# Patient Record
Sex: MALE | HISPANIC OR LATINO | Employment: OTHER | URBAN - METROPOLITAN AREA
[De-identification: names, ages, dates, MRNs, and addresses within clinical notes are randomized per-mention and may not be internally consistent; named-entity substitution may affect disease eponyms.]

---

## 2017-07-11 ENCOUNTER — OFFICE VISIT (OUTPATIENT)
Dept: FAMILY MEDICINE | Facility: CLINIC | Age: 42
End: 2017-07-11

## 2017-07-11 VITALS
WEIGHT: 201.2 LBS | BODY MASS INDEX: 30.49 KG/M2 | OXYGEN SATURATION: 98 % | HEIGHT: 68 IN | HEART RATE: 65 BPM | DIASTOLIC BLOOD PRESSURE: 81 MMHG | TEMPERATURE: 97.8 F | SYSTOLIC BLOOD PRESSURE: 119 MMHG

## 2017-07-11 DIAGNOSIS — J01.90 ACUTE SINUSITIS WITH SYMPTOMS > 10 DAYS: Primary | ICD-10-CM

## 2017-07-11 PROCEDURE — 99213 OFFICE O/P EST LOW 20 MIN: CPT | Performed by: PHYSICIAN ASSISTANT

## 2017-07-11 RX ORDER — BENZONATATE 100 MG/1
CAPSULE ORAL
Refills: 0 | COMMUNITY
Start: 2017-06-28 | End: 2017-12-14

## 2017-07-11 RX ORDER — FLUTICASONE PROPIONATE 50 MCG
2 SPRAY, SUSPENSION (ML) NASAL DAILY
Qty: 1 BOTTLE | Refills: 3 | Status: SHIPPED | OUTPATIENT
Start: 2017-07-11 | End: 2017-12-14

## 2017-07-11 NOTE — MR AVS SNAPSHOT
After Visit Summary   7/11/2017    Chun Coley    MRN: [id number]           Patient Information     Date Of Birth          1975        Visit Information        Provider Department      7/11/2017 9:00 AM Vahe Leon PA [facility]        Today's Diagnoses     Acute sinusitis with symptoms > 10 days    -  1      Care Instructions    Based on your medical history and these are the current health maintenance or preventive care services that you are due for (some may have been done at this visit)  Health Maintenance Due   Topic Date Due     TETANUS IMMUNIZATION (SYSTEM ASSIGNED)  06/12/1991     LIPID SCREEN Q5 YR MALE (SYSTEM ASSIGNED)  06/12/2008     ASTHMA CONTROL TEST Q6 MOS  08/03/2014     ASTHMA ACTION PLAN Q1 YR  02/06/2015       At [facility], we strive to deliver an exceptional experience to you, every time we see you.    If you receive a survey in the mail, please send us back your thoughts. We really do value your feedback.    Your care team's suggested websites for health information:  Www.[employer].org : Up to date and easily searchable information on multiple topics.  Www.medlineplus.gov : medication info, interactive tutorials, watch real surgeries online  Www.familydoctor.org : good info from the Academy of Family Physicians  Www.cdc.gov : public health info, travel advisories, epidemics (H1N1)  Www.aap.org : children's health info, normal development, vaccinations  Www.health.state[place]us : [place] dept of health, public health issues in [place], N1N1    How to contact your care team:   Urgent Care/Same Day [phone]         Pharmacy [phone]      Clinic hours  M-Th 7 am-7 pm   Fri 7 am-5 pm.   Urgent care M-F 11 am-9 pm,   Sat/Sun 9 am-5 pm.  Pharmacy M-Th 8 am-8 pm Fri 8 am-6 pm  Sat/Sun 9 am-5 pm.     All password changes, disabled accounts, or ID changes in [employer]/MyHealth will be done by our Access Services  Department.    If you need help with your account or password, call: [phone]. Clinic staff no longer has the ability to change passwords.     TRIAL over the counter PSEUDOEPHEDRINE (during the day) AND AFRIN (Oxymetazolin) NASAL SPRAY  (for 3 days maximum).      Sinusitis [Abx Tx]    The sinuses are air-filled spaces within the bones of the face. They connect to the inside of the nose. Sinusitis is an inflammation of the tissue lining the sinus cavity. Sinus inflammation can occur during a cold or hay-fever (allergies to pollens and other particles in the air) and cause symptoms of sinus congestion and fullness. A sinus infection causes fever, headache and facial pain. There is usually green or yellow drainage from the nose or into the back of the throat (post-nasal drip). Antibiotics are prescribed to treat this condition.  Home Care:  Drink plenty of water, hot tea, and other liquids to stay well hydrated. This thins the mucus and promotes sinus drainage.  Apply heat to the painful areas of the face. Use a towel soaked in hot water. Or,  the shower and direct the hot spray onto your face. This is a good way to inhale warm water vapor and get heat on your face at the same time. (Cover your mouth and nose with your hands so you can still breathe as you do this.)  Use a vaporizer with products such as Vicks VapoRub (contains menthol) at night. Suck on peppermint, menthol or eucalyptus hard candies during the day.  An expectorant containing guaifenesin (such as Robitussin), helps to thin the mucus and promote drainage from the sinuses.  Over-the-counter decongestants may be used unless a similar medicine was prescribed. Nasal sprays work the fastest. Use one that contains phenylephrine (Sidney-synephrine, Sinex and others) or oxymetazoline (Afrin). First blow the nose gently to remove mucus, then apply the drops. Do not use these medicines more often than directed on the label or for more than three days  or symptoms may worsen. You may also use tablets containing pseudoephedrine (Sudafed). Many sinus remedies combine ingredients, which may increase side effects. Read the labels or ask the pharmacist for help. NOTE: Persons with high blood pressure should not use decongestants. They can raise blood pressure.  Antihistamines are useful if allergies are a cause of your sinusitis. The mildest one is chlorpheniramine (available without a prescription). The dose for adults is 8-12mg three times a day. [NOTE: Do not use chlorpheniramine if you have glaucoma or if you are a man with trouble urinating due to an enlarged prostate.] Claritin (loratidine) is an antihistamine that causes less drowsiness and is a good alternative for daytime use.  Do not use nasal rinses or irrigation during an acute sinus infection, unless advised by your doctor. Rinsing may spread the infection to other sinuses.  You may use acetaminophen (Tylenol) or ibuprofen (Motrin, Advil) to control pain, unless another pain medicine was prescribed. [ NOTE: If you have chronic liver or kidney disease or ever had a stomach ulcer, talk with your doctor before using these medicines.] (Aspirin should never be used in anyone under 18 years of age who is ill with a fever. It may cause severe liver damage.)  Finish the full course, even if you are feeling better after a few days.  Follow Up  with your doctor or this facility in one week or as instructed by our staff if not improving.  Get Prompt Medical Attention  if any of the following occur:  Facial pain or headache becomes more severe  Stiff neck  Unusual drowsiness or confusion, or not acting like your normal self  Swelling of the forehead or eyelids  Vision problems including blurred or double vision  Fever of 100.4 F (38 C) or higher, or as directed by your healthcare provider  Seizure    2955-5294 Derik Chavez, [address] [place] [address], [place] [address]. All rights reserved. This information is not  "intended as a substitute for professional medical care. Always follow your healthcare professional's instructions.                Follow-ups after your visit        Follow-up notes from your care team     Return if symptoms worsen or fail to improve.      Who to contact     If you have questions or need follow up information about today's clinic visit or your schedule please contact [facility] directly at [phone].  Normal or non-critical lab and imaging results will be communicated to you by MyChart, letter or phone within 4 business days after the clinic has received the results. If you do not hear from us within 7 days, please contact the clinic through [employer] or phone. If you have a critical or abnormal lab result, we will notify you by phone as soon as possible.  Submit refill requests through "[employer] or call your pharmacy and they will forward the refill request to us. Please allow 3 business days for your refill to be completed.          Additional Information About Your Visit        MyChart Information     "[employer] lets you send messages to your doctor, view your test results, renew your prescriptions, schedule appointments and more. To sign up, go to [email or web address] . Click on \"Log in\" on the left side of the screen, which will take you to the Welcome page. Then click on \"Sign up Now\" on the right side of the page.     You will be asked to enter the access code listed below, as well as some personal information. Please follow the directions to create your username and password.     Your access code is: [email or web address]  Expires: 10/9/2017  9:15 AM     Your access code will  in 90 days. If you need help or a new code, please call your [facility] or [phone].        Care EveryWhere ID     This is your Care EveryWhere ID. This could be used by other organizations to access your [facility] medical records  [id number]        Your Vitals Were     Pulse Temperature Height " "Pulse Oximetry BMI (Body Mass Index)       65 97.8  F (36.6  C) (Oral) 5' 8\" (1.727 m) 98% 30.59 kg/m2        Blood Pressure from Last 3 Encounters:   07/11/17 119/81   04/21/15 108/72   02/03/14 108/68    Weight from Last 3 Encounters:   07/11/17 201 lb 3.2 oz (91.3 kg)   04/21/15 193 lb 9.6 oz (87.8 kg)   02/03/14 189 lb 9.6 oz (86 kg)              Today, you had the following     No orders found for display         Today's Medication Changes          These changes are accurate as of: 7/11/17  9:15 AM.  If you have any questions, ask your nurse or doctor.               Start taking these medicines.        Dose/Directions    amoxicillin-clavulanate 875-125 MG per tablet   Commonly known as:  AUGMENTIN   Used for:  Acute sinusitis with symptoms > 10 days   Started by:  Vahe Leon PA        Dose:  1 tablet   Take 1 tablet by mouth 2 times daily   Quantity:  20 tablet   Refills:  0         These medicines have changed or have updated prescriptions.        Dose/Directions    * fluticasone 50 MCG/ACT spray   Commonly known as:  FLONASE   This may have changed:  Another medication with the same name was added. Make sure you understand how and when to take each.   Used for:  Mild persistent asthma   Changed by:  Lily Morgan APRN CNP        Dose:  1 spray   Spray 1 spray into both nostrils daily   Quantity:  3 Package   Refills:  3       * fluticasone 50 MCG/ACT spray   Commonly known as:  FLONASE   This may have changed:  You were already taking a medication with the same name, and this prescription was added. Make sure you understand how and when to take each.   Used for:  Acute sinusitis with symptoms > 10 days   Changed by:  Vahe Leon PA        Dose:  2 spray   Spray 2 sprays into both nostrils daily   Quantity:  1 Bottle   Refills:  3       * Notice:  This list has 2 medication(s) that are the same as other medications prescribed for you. Read the directions carefully, and ask " your doctor or other care provider to review them with you.         Where to get your medicines      These [place] [facility] [place] [address]  [address], [facility] [address]     Phone:  [phone]     amoxicillin-clavulanate 875-125 MG per tablet    fluticasone 50 MCG/ACT spray                Primary Care Provider Office Phone # Fax #    Jorge Alberto Crawford [phone]       [facility] [address]  [facility] [address]        Equal Access to Services     [facility]: Hadii aad ku hadasho Soomaali, waaxda luqadaha, qaybta kaalmada adeegyada, waxay idiin hayaan adeeg khararobert krishnamurthy . So [facility] [phone].    ATENCIÓN: Si habla español, tiene a banegas disposición servicios gratuitos de asistencia lingüística. Ll[facility] [phone].    We comply with applicable federal civil rights laws and [place] laws. We do not discriminate on the basis of race, color, national origin, age, disability sex, sexual orientation or gender identity.            Thank you!     Thank you for choosing [facility]  for your care. Our goal is always to provide you with excellent care. Hearing back from our patients is one way we can continue to improve our services. Please take a few minutes to complete the written survey that you may receive in the mail after your visit with us. Thank you!             Your Updated Medication List - Protect others around you: Learn how to safely use, store and throw away your medicines at www.disposemymeds.org.          This list is accurate as of: 7/11/17  9:15 AM.  Always use your most recent med list.                   Brand Name Dispense Instructions for use Diagnosis    * albuterol 108 (90 BASE) MCG/ACT Inhaler    PROAIR HFA/PROVENTIL HFA/VENTOLIN HFA    3 Inhaler    Inhale 2 puffs into the lungs every 6 hours as needed    Mild persistent asthma       * albuterol 108 (90  BASE) MCG/ACT Inhaler    PROAIR HFA/PROVENTIL HFA/VENTOLIN HFA    1 Inhaler    Inhale 2 puffs into the lungs every 4 hours as needed for shortness of breath / dyspnea or wheezing    Acute bronchitis with symptoms > 10 days       amoxicillin-clavulanate 875-125 MG per tablet    AUGMENTIN    20 tablet    Take 1 tablet by mouth 2 times daily    Acute sinusitis with symptoms > 10 days       aspirin 81 MG tablet      Take 1 tablet by mouth daily        benzonatate 100 MG capsule    TESSALON     SWALLOW WHOLE ONE CAPSULE BY MOUTH THREE TIMES DAILY AS NEEDED.DON'T BREAK,CHEW,DISSOLVE,CUT,CRUSH.        * fluticasone 50 MCG/ACT spray    FLONASE    3 Package    Spray 1 spray into both nostrils daily    Mild persistent asthma       * fluticasone 50 MCG/ACT spray    FLONASE    1 Bottle    Spray 2 sprays into both nostrils daily    Acute sinusitis with symptoms > 10 days       fluticasone-salmeterol 250-50 MCG/DOSE diskus inhaler    ADVAIR DISKUS    3 Inhaler    Inhale 1 puff into the lungs every 12 hours    Mild persistent asthma       * Notice:  This list has 4 medication(s) that are the same as other medications prescribed for you. Read the directions carefully, and ask your doctor or other care provider to review them with you.

## 2017-07-11 NOTE — PATIENT INSTRUCTIONS
Based on your medical history and these are the current health maintenance or preventive care services that you are due for (some may have been done at this visit)  Health Maintenance Due   Topic Date Due     TETANUS IMMUNIZATION (SYSTEM ASSIGNED)  06/12/1991     LIPID SCREEN Q5 YR MALE (SYSTEM ASSIGNED)  06/12/2008     ASTHMA CONTROL TEST Q6 MOS  08/03/2014     ASTHMA ACTION PLAN Q1 YR  02/06/2015       At [facility], we strive to deliver an exceptional experience to you, every time we see you.    If you receive a survey in the mail, please send us back your thoughts. We really do value your feedback.    Your care team's suggested websites for health information:  Www.[employer].org : Up to date and easily searchable information on multiple topics.  Www.medlineplus.gov : medication info, interactive tutorials, watch real surgeries online  Www.familydoctor.org : good info from the Academy of Family Physicians  Www.cdc.gov : public health info, travel advisories, epidemics (H1N1)  Www.aap.org : children's health info, normal development, vaccinations  Www.health.[facility].[place].us : [place] dept of health, public health issues in [place], N1N1    How to contact your care team:   Urgent Care/Same Day [phone]         Pharmacy [phone]      Clinic hours  M-Th 7 am-7 pm   Fri 7 am-5 pm.   Urgent care M-F 11 am-9 pm,   Sat/Sun 9 am-5 pm.  Pharmacy M-Th 8 am-8 pm Fri 8 am-6 pm  Sat/Sun 9 am-5 pm.     All password changes, disabled accounts, or ID changes in [employer]/MyHealth will be done by our Access Services Department.    If you need help with your account or password, call: [phone]. Clinic staff no longer has the ability to change passwords.     TRIAL over the counter PSEUDOEPHEDRINE (during the day) AND AFRIN (Oxymetazolin) NASAL SPRAY  (for 3 days maximum).      Sinusitis [Abx Tx]    The sinuses are air-filled spaces within the bones of the face. They connect to the inside of the nose. Sinusitis  is an inflammation of the tissue lining the sinus cavity. Sinus inflammation can occur during a cold or hay-fever (allergies to pollens and other particles in the air) and cause symptoms of sinus congestion and fullness. A sinus infection causes fever, headache and facial pain. There is usually green or yellow drainage from the nose or into the back of the throat (post-nasal drip). Antibiotics are prescribed to treat this condition.  Home Care:  Drink plenty of water, hot tea, and other liquids to stay well hydrated. This thins the mucus and promotes sinus drainage.  Apply heat to the painful areas of the face. Use a towel soaked in hot water. Or,  the shower and direct the hot spray onto your face. This is a good way to inhale warm water vapor and get heat on your face at the same time. (Cover your mouth and nose with your hands so you can still breathe as you do this.)  Use a vaporizer with products such as Vicks VapoRub (contains menthol) at night. Suck on peppermint, menthol or eucalyptus hard candies during the day.  An expectorant containing guaifenesin (such as Robitussin), helps to thin the mucus and promote drainage from the sinuses.  Over-the-counter decongestants may be used unless a similar medicine was prescribed. Nasal sprays work the fastest. Use one that contains phenylephrine (Sidney-synephrine, Sinex and others) or oxymetazoline (Afrin). First blow the nose gently to remove mucus, then apply the drops. Do not use these medicines more often than directed on the label or for more than three days or symptoms may worsen. You may also use tablets containing pseudoephedrine (Sudafed). Many sinus remedies combine ingredients, which may increase side effects. Read the labels or ask the pharmacist for help. NOTE: Persons with high blood pressure should not use decongestants. They can raise blood pressure.  Antihistamines are useful if allergies are a cause of your sinusitis. The mildest one is  chlorpheniramine (available without a prescription). The dose for adults is 8-12mg three times a day. [NOTE: Do not use chlorpheniramine if you have glaucoma or if you are a man with trouble urinating due to an enlarged prostate.] Claritin (loratidine) is an antihistamine that causes less drowsiness and is a good alternative for daytime use.  Do not use nasal rinses or irrigation during an acute sinus infection, unless advised by your doctor. Rinsing may spread the infection to other sinuses.  You may use acetaminophen (Tylenol) or ibuprofen (Motrin, Advil) to control pain, unless another pain medicine was prescribed. [ NOTE: If you have chronic liver or kidney disease or ever had a stomach ulcer, talk with your doctor before using these medicines.] (Aspirin should never be used in anyone under 18 years of age who is ill with a fever. It may cause severe liver damage.)  Finish the full course, even if you are feeling better after a few days.  Follow Up  with your doctor or this facility in one week or as instructed by our staff if not improving.  Get Prompt Medical Attention  if any of the following occur:  Facial pain or headache becomes more severe  Stiff neck  Unusual drowsiness or confusion, or not acting like your normal self  Swelling of the forehead or eyelids  Vision problems including blurred or double vision  Fever of 100.4 F (38 C) or higher, or as directed by your healthcare provider  Seizure    [id number] Derik [facility], [address] [place] [address], [place] [address]. All rights reserved. This information is not intended as a substitute for professional medical care. Always follow your healthcare professional's instructions.

## 2017-07-11 NOTE — PROGRESS NOTES
"  SUBJECTIVE:                                                    Chun Coley is a 42 year old male who presents to clinic today for the following health issues:      ED/UC Followup:    Facility:  [facility]- tarun with tessalon   Date of visit: 06/28/17  Reason for visit: Cough x 3 weeks, hx asthma, hzas been using albuterol  Current Status: Same    no rhinitis,  But + PND, no fevers    Ok with sports and sleeping but duyring day a lot of coughing    Lipids checked recently at another clinic    No Known Allergies    Past Medical History:   Diagnosis Date     Allergic rhinitis      Asthma      Hypercholesterolemia          Current Outpatient Prescriptions on File Prior to Visit:  albuterol (PROAIR HFA, PROVENTIL HFA, VENTOLIN HFA) 108 (90 BASE) MCG/ACT inhaler Inhale 2 puffs into the lungs every 4 hours as needed for shortness of breath / dyspnea or wheezing   fluticasone-salmeterol (ADVAIR DISKUS) 250-50 MCG/DOSE diskus inhaler Inhale 1 puff into the lungs every 12 hours   albuterol (PROAIR HFA, PROVENTIL HFA, VENTOLIN HFA) 108 (90 BASE) MCG/ACT inhaler Inhale 2 puffs into the lungs every 6 hours as needed   fluticasone (FLONASE) 50 MCG/ACT nasal spray Spray 1 spray into both nostrils daily   aspirin 81 MG tablet Take 1 tablet by mouth daily     No current facility-administered medications on file prior to visit.     Social History   Substance Use Topics     Smoking status: Never Smoker     Smokeless tobacco: Never Used     Alcohol use Yes       ROS:  Consitutional: As above  ENT: As above  Respiratory: As above    OBJECTIVE:  /81 (BP Location: Left arm, Patient Position: Chair, Cuff Size: Adult Regular)  Pulse 65  Temp 97.8  F (36.6  C) (Oral)  Ht 5' 8\" (1.727 m)  Wt 201 lb 3.2 oz (91.3 kg)  SpO2 98%  BMI 30.59 kg/m2  GENERAL APPEARANCE: healthy, alert and no distress  EYES: conjunctiva clear  HENT:  TMs w/o erythema, effusion or bulging.  Nose and mouth without ulcers, erythema or lesions.  NO " tonsillar enlargement erythema or exudates.   NECK: supple, nontender, no lymphadenopathy  RESP: lungs clear to auscultation - no rales, rhonchi or wheezes  CV: regular rates and rhythm, normal S1 S2, no murmur noted  NEURO: awake, alert          ASSESSMENT: Well appearing.    ICD-10-CM    1. Acute sinusitis with symptoms > 10 days J01.90 fluticasone (FLONASE) 50 MCG/ACT spray     amoxicillin-clavulanate (AUGMENTIN) 875-125 MG per tablet         PLAN:  Lots of rest and fluids.  RTC if any worsening symptoms or if not improving.    Pasquale Leon PA-C

## 2017-07-11 NOTE — NURSING NOTE
"Chief Complaint   Patient presents with     Cough     Pt was seen at [facility] on 06/28/17 and he was given benzonatate 100 mg and Ventolin HFA. Pt that he has asthma.       Initial /81 (BP Location: Left arm, Patient Position: Chair, Cuff Size: Adult Regular)  Pulse 65  Temp 97.8  F (36.6  C) (Oral)  Ht 5' 8\" (1.727 m)  Wt 201 lb 3.2 oz (91.3 kg)  SpO2 98%  BMI 30.59 kg/m2 Estimated body mass index is 30.59 kg/(m^2) as calculated from the following:    Height as of this encounter: 5' 8\" (1.727 m).    Weight as of this encounter: 201 lb 3.2 oz (91.3 kg).  Medication Reconciliation: complete   Marnie Magdaleno MA        "

## 2017-07-12 ASSESSMENT — ASTHMA QUESTIONNAIRES: ACT_TOTALSCORE: 6

## 2017-12-14 ENCOUNTER — OFFICE VISIT (OUTPATIENT)
Dept: FAMILY MEDICINE | Facility: CLINIC | Age: 42
End: 2017-12-14

## 2017-12-14 VITALS
BODY MASS INDEX: 31.3 KG/M2 | HEART RATE: 71 BPM | DIASTOLIC BLOOD PRESSURE: 74 MMHG | WEIGHT: 206.5 LBS | SYSTOLIC BLOOD PRESSURE: 122 MMHG | HEIGHT: 68 IN | TEMPERATURE: 98.2 F | OXYGEN SATURATION: 95 %

## 2017-12-14 DIAGNOSIS — Z23 NEED FOR PROPHYLACTIC VACCINATION AND INOCULATION AGAINST INFLUENZA: ICD-10-CM

## 2017-12-14 DIAGNOSIS — J45.20 MILD INTERMITTENT ASTHMA WITHOUT COMPLICATION: ICD-10-CM

## 2017-12-14 DIAGNOSIS — B35.4 TINEA CORPORIS: Primary | ICD-10-CM

## 2017-12-14 PROCEDURE — 90471 IMMUNIZATION ADMIN: CPT | Performed by: PHYSICIAN ASSISTANT

## 2017-12-14 PROCEDURE — 99214 OFFICE O/P EST MOD 30 MIN: CPT | Mod: 25 | Performed by: PHYSICIAN ASSISTANT

## 2017-12-14 PROCEDURE — 90686 IIV4 VACC NO PRSV 0.5 ML IM: CPT | Performed by: PHYSICIAN ASSISTANT

## 2017-12-14 RX ORDER — ALBUTEROL SULFATE 90 UG/1
2 AEROSOL, METERED RESPIRATORY (INHALATION) EVERY 6 HOURS PRN
Qty: 18 G | Refills: 3 | Status: SHIPPED | OUTPATIENT
Start: 2017-12-14 | End: 2018-09-18

## 2017-12-14 RX ORDER — KETOCONAZOLE 20 MG/G
CREAM TOPICAL 2 TIMES DAILY
Qty: 60 G | Refills: 2 | Status: SHIPPED | OUTPATIENT
Start: 2017-12-14 | End: 2018-09-18

## 2017-12-14 NOTE — PROGRESS NOTES
SUBJECTIVE:   Chun Coley is a 42 year old male who presents to clinic today for the following health issues:      Rash  Onset: a week ago    Description:   Location: body  Character: round, red  Itching (Pruritis): no     Progression of Symptoms:  worsening    Accompanying Signs & Symptoms:  Fever: no   Body aches or joint pain: no   Sore throat symptoms: no   Recent cold symptoms: no     History:   Previous similar rash: no     Precipitating factors:   Exposure to similar rash: no   New exposures: None   Recent travel: no     Alleviating factors:  none    Therapies Tried and outcome: none      Asthma Follow-Up    Was ACT completed today?    Yes    ACT Total Scores 12/14/2017   ACT TOTAL SCORE -   ASTHMA ER VISITS -   ASTHMA HOSPITALIZATIONS -   ACT TOTAL SCORE (Goal Greater than or Equal to 20) 24   In the past 12 months, how many times did you visit the emergency room for your asthma without being admitted to the hospital? 0   In the past 12 months, how many times were you hospitalized overnight because of your asthma? 0       Recent asthma triggers that patient is dealing with: None    Problem list and histories reviewed & adjusted, as indicated.  Additional history: as documented    Patient Active Problem List   Diagnosis     CARDIOVASCULAR SCREENING; LDL GOAL LESS THAN 160     Hyperlipidemia     Mild intermittent asthma without complication     No past surgical history on file.    Social History   Substance Use Topics     Smoking status: Never Smoker     Smokeless tobacco: Never Used     Alcohol use Yes     No family history on file.      Current Outpatient Prescriptions   Medication Sig Dispense Refill     albuterol (PROAIR HFA/PROVENTIL HFA/VENTOLIN HFA) 108 (90 BASE) MCG/ACT Inhaler Inhale 2 puffs into the lungs every 6 hours as needed 18 g 3     ketoconazole (NIZORAL) 2 % cream Apply topically 2 times daily 60 g 2     albuterol (PROAIR HFA, PROVENTIL HFA, VENTOLIN HFA) 108 (90 BASE) MCG/ACT inhaler  "Inhale 2 puffs into the lungs every 4 hours as needed for shortness of breath / dyspnea or wheezing 1 Inhaler 4     aspirin 81 MG tablet Take 1 tablet by mouth daily       [DISCONTINUED] albuterol (PROAIR HFA, PROVENTIL HFA, VENTOLIN HFA) 108 (90 BASE) MCG/ACT inhaler Inhale 2 puffs into the lungs every 6 hours as needed 3 Inhaler 1     No Known Allergies        ROS:  Constitutional, HEENT, cardiovascular, pulmonary, GI, , musculoskeletal, neuro, skin, endocrine and psych systems are negative, except as otherwise noted.      OBJECTIVE:   /74 (BP Location: Left arm, Patient Position: Chair, Cuff Size: Adult Regular)  Pulse 71  Temp 98.2  F (36.8  C) (Oral)  Ht 5' 8\" (1.727 m)  Wt 206 lb 8 oz (93.7 kg)  SpO2 95%  BMI 31.4 kg/m2  Body mass index is 31.4 kg/(m^2).  GENERAL: healthy, alert and no distress  NECK: no adenopathy, no asymmetry, masses, or scars and thyroid normal to palpation  RESP: lungs clear to auscultation - no rales, rhonchi or wheezes  CV: regular rate and rhythm, normal S1 S2, no S3 or S4, no murmur, click or rub, no peripheral edema and peripheral pulses strong  ABDOMEN: soft, nontender, no hepatosplenomegaly, no masses and bowel sounds normal  MS: no gross musculoskeletal defects noted, no edema  SKIN: round erythematous rased lesions with rased borders on the right upper arm and back     Diagnostic Test Results:  none     ASSESSMENT/PLAN:       ICD-10-CM    1. Tinea corporis B35.4 ketoconazole (NIZORAL) 2 % cream   2. Mild intermittent asthma without complication J45.20 albuterol (PROAIR HFA/PROVENTIL HFA/VENTOLIN HFA) 108 (90 BASE) MCG/ACT Inhaler   3. Need for prophylactic vaccination and inoculation against influenza Z23 HC FLU VAC PRESRV FREE QUAD SPLIT VIR 3+YRS IM     VACCINE ADMINISTRATION, INITIAL     1.Ketoconazole cream apply twice a day until rash is gone   Continue Albuterol 2 pufs every 4-6 hours as needed or 30 min before exercise   2. Stable  Continue Albuterol on as " needed basis     Heidy Neal PA-C  [facility]

## 2017-12-14 NOTE — NURSING NOTE
"Chief Complaint   Patient presents with     Derm Problem     Started  a week ago       Initial /74 (BP Location: Left arm, Patient Position: Chair, Cuff Size: Adult Regular)  Pulse 71  Temp 98.2  F (36.8  C) (Oral)  Ht 5' 8\" (1.727 m)  Wt 206 lb 8 oz (93.7 kg)  SpO2 95%  BMI 31.4 kg/m2 Estimated body mass index is 31.4 kg/(m^2) as calculated from the following:    Height as of this encounter: 5' 8\" (1.727 m).    Weight as of this encounter: 206 lb 8 oz (93.7 kg).  Medication Reconciliation: complete   Marnie Magdaleno MA        "

## 2017-12-14 NOTE — NURSING NOTE
Injectable Influenza Immunization Documentation    1.  Are you sick today? (Fever of 100.5 or higher on the day of the clinic)   No    2.  Have you ever had Guillain-Oglesby Syndrome within 6 weeks of an influenza vaccionation?  No    3. Do you have a life-threatening allergy to eggs?  No    4. Do you have a life-threatening allergy to a component of the vaccine? May include antibiotics, gelatin or latex.  No     5. Have you ever had a reaction to a dose of flu vaccine that needed immediate medical attention?  No     Form completed by Stef Cabrera MA

## 2017-12-14 NOTE — MR AVS SNAPSHOT
After Visit Summary   12/14/2017    Chun Coley    MRN: [id number]           Patient Information     Date Of Birth          1975        Visit Information        Provider Department      12/14/2017 11:40 AM Heidy Neal PA-C [facility]        Today's Diagnoses     Need for prophylactic vaccination and inoculation against influenza    -  1    Mild intermittent asthma without complication        Tinea corporis          Care Instructions    Ketoconazole cream apply twice a day until rash is gone   Continue Albuterol 2 pufs every 4-6 hours as needed or 30 min before exercise   Ringworm of the Skin    Ringworm is a fungal infection of the skin. Despite the name, a worm doesn't cause it. The cause of ringworm is a fungus that infects the outer layers of the skin. It is also not caused by bed bugs, scabies, or lice. These are totally different.  The medical term for ringworm is tinea. It can affect most parts of your body, although it seems to do better in moist areas of the body and around hair. It can be on almost any part of your body, including:    Arms, hands, legs, chest, feet, and back    Scalp    Beard    Groin    Between the toes  Depending on where it is located, sometimes the name changes:    Tinea capitis (scalp)    Tinea cruris (groin)    Tinea corporis (body)    Tinea pedis (feet)  Causes  Ringworm is very common all over the world, including the [place] It can take less than 1 week up to 2 weeks before you develop the infection after being exposed. So, you may not figure out the exact cause.  It is spread through direct contact with:    An infected person or animal    Infected soil, or objects such as towels, clothing, and delaney  Symptoms  At first you might not notice ringworm. Or you may just see a small, red, often raised itchy spot or pimple. Sometimes there may only be one spot. At other times there may be several. Ringworm can look slightly  different on different parts of the body, but there are some things are always present:    Irregular, round, oval or ring-shaped, which is why it's called ringworm    Clearer or lighter color at the center, since it spreads from the center of the spot outward    Red or inflamed look    Raised    Itchy    Scaly, dry, or flaky  Home care  Follow these tips to help care for yourself at home:    Leave it alone. Don't scratch at the rash or pick it. This can increase the chance of infection and scarring.    Take medicine as prescribed. If you were prescribed a cream, apply it exactly as directed. Make sure to put the cream not just on the rash, but also on the skin 1 or 2 inches around it. Medicine by mouth is sometimes needed, particularly for ringworm on the scalp. Take it as directed and until your healthcare provider says to stop.    Keep it from spreading to others. Untreated ringworm of the skin is contagious by skin-to-skin contact. Your child may return to school 2 days after treatment has started.  Prevention  To some degree, prevention depends on what part of your body was affected. In general, the following good hygiene can help.    Clean up after you get dirty or sweaty, or after using a locker room.    When possible, don t share delaney and brushes.    Avoid having your skin and feet wet or damp for long periods.    Wear clean, loose-fitting underwear.  Follow-up care  Follow up with your healthcare provider as advised by our staff if the rash does not improve after 10 days of treatment or if the rash spreads to other areas of the body.  When to seek medical advice  Call your healthcare provider right away if any of these occur:    Redness around the rash gets worse    Fluid drains from the rash    Fever of 100.4 F (38 C) or higher, or as directed by your healthcare provider  Date Last Reviewed: 8/1/2016 2000-2017 [employer]. [address] [place] [address], [place] [address]. All rights reserved.  "This information is not intended as a substitute for professional medical care. Always follow your healthcare professional's instructions.                Follow-ups after your visit        Who to contact     If you have questions or need follow up information about today's clinic visit or your schedule please contact [facility] directly at [phone].  Normal or non-critical lab and imaging results will be communicated to you by MyChart, letter or phone within 4 business days after the clinic has received the results. If you do not hear from us within 7 days, please contact the clinic through [facility] or phone. If you have a critical or abnormal lab result, we will notify you by phone as soon as possible.  Submit refill requests through [employer] or call your pharmacy and they will forward the refill request to us. Please allow 3 business days for your refill to be completed.          Additional Information About Your Visit        MyChart Information     [employer] lets you send messages to your doctor, view your test results, renew your prescriptions, schedule appointments and more. To sign up, go to [email or web address] . Click on \"Log in\" on the left side of the screen, which will take you to the Welcome page. Then click on \"Sign up Now\" on the right side of the page.     You will be asked to enter the access code listed below, as well as some personal information. Please follow the directions to create your username and password.     Your access code is: [email or web address]  Expires: 3/14/2018 11:47 AM     Your access code will  in 90 days. If you need help or a new code, please call your [place] clinic or [phone].        Care EveryWhere ID     This is your Care EveryWhere ID. This could be used by other organizations to access your [place] medical records  [id number]        Your Vitals Were     Pulse Temperature Height Pulse Oximetry BMI (Body Mass Index)       71 98.2  F (36.8  C) " "(Oral) 5' 8\" (1.727 m) 95% 31.4 kg/m2        Blood Pressure from Last 3 Encounters:   12/14/17 122/74   07/11/17 119/81   04/21/15 108/72    Weight from Last 3 Encounters:   12/14/17 206 lb 8 oz (93.7 kg)   07/11/17 201 lb 3.2 oz (91.3 kg)   04/21/15 193 lb 9.6 oz (87.8 kg)              We Performed the Following     Asthma Action Plan (AAP)          Today's Medication Changes          These changes are accurate as of: 12/14/17 11:47 AM.  If you have any questions, ask your nurse or doctor.               Start taking these medicines.        Dose/Directions    ketoconazole 2 % cream   Commonly known as:  NIZORAL   Used for:  Tinea corporis   Started by:  Heidy Neal PA-C        Apply topically 2 times daily   Quantity:  60 g   Refills:  2            Where to get your medicines      These medications were sent to [facility] - [place], [place] - [address]  [address], [facility] [address]     Phone:  [phone]     albuterol 108 (90 BASE) MCG/ACT Inhaler    ketoconazole 2 % cream                Primary Care Provider Office Phone # Fax #    [facility] [phone]       [address]  [facility] [address]        Equal Access to Services     [facility]: Hadii aad ku hadasho Soomaali, waaxda luqadaha, qaybta kaalmada adeegyada, waxay santos larose. So [facility] [phone].    ATENCIÓN: Si habla español, tiene a banegas disposición servicios gratuitos de asistencia lingüística. Llame al [phone].    We comply with applicable federal civil rights laws and [place] laws. We do not discriminate on the basis of race, color, national origin, age, disability, sex, sexual orientation, or gender identity.            Thank you!     Thank you for choosing [facility]  for your care. Our goal is always to provide you with excellent care. Hearing back from our patients is one way we can continue to " improve our services. Please take a few minutes to complete the written survey that you may receive in the mail after your visit with us. Thank you!             Your Updated Medication List - Protect others around you: Learn how to safely use, store and throw away your medicines at www.disposemymeds.org.          This list is accurate as of: 12/14/17 11:47 AM.  Always use your most recent med list.                   Brand Name Dispense Instructions for use Diagnosis    * albuterol 108 (90 BASE) MCG/ACT Inhaler    PROAIR HFA/PROVENTIL HFA/VENTOLIN HFA    1 Inhaler    Inhale 2 puffs into the lungs every 4 hours as needed for shortness of breath / dyspnea or wheezing    Acute bronchitis with symptoms > 10 days       * albuterol 108 (90 BASE) MCG/ACT Inhaler    PROAIR HFA/PROVENTIL HFA/VENTOLIN HFA    18 g    Inhale 2 puffs into the lungs every 6 hours as needed    Mild intermittent asthma without complication       aspirin 81 MG tablet      Take 1 tablet by mouth daily        ketoconazole 2 % cream    NIZORAL    60 g    Apply topically 2 times daily    Tinea corporis       * Notice:  This list has 2 medication(s) that are the same as other medications prescribed for you. Read the directions carefully, and ask your doctor or other care provider to review them with you.

## 2017-12-14 NOTE — PATIENT INSTRUCTIONS
Ketoconazole cream apply twice a day until rash is gone   Continue Albuterol 2 pufs every 4-6 hours as needed or 30 min before exercise   Ringworm of the Skin    Ringworm is a fungal infection of the skin. Despite the name, a worm doesn't cause it. The cause of ringworm is a fungus that infects the outer layers of the skin. It is also not caused by bed bugs, scabies, or lice. These are totally different.  The medical term for ringworm is tinea. It can affect most parts of your body, although it seems to do better in moist areas of the body and around hair. It can be on almost any part of your body, including:    Arms, hands, legs, chest, feet, and back    Scalp    Beard    Groin    Between the toes  Depending on where it is located, sometimes the name changes:    Tinea capitis (scalp)    Tinea cruris (groin)    Tinea corporis (body)    Tinea pedis (feet)  Causes  Ringworm is very common all over the world, including the [place] It can take less than 1 week up to 2 weeks before you develop the infection after being exposed. So, you may not figure out the exact cause.  It is spread through direct contact with:    An infected person or animal    Infected soil, or objects such as towels, clothing, and delaney  Symptoms  At first you might not notice ringworm. Or you may just see a small, red, often raised itchy spot or pimple. Sometimes there may only be one spot. At other times there may be several. Ringworm can look slightly different on different parts of the body, but there are some things are always present:    Irregular, round, oval or ring-shaped, which is why it's called ringworm    Clearer or lighter color at the center, since it spreads from the center of the spot outward    Red or inflamed look    Raised    Itchy    Scaly, dry, or flaky  Home care  Follow these tips to help care for yourself at home:    Leave it alone. Don't scratch at the rash or pick it. This can increase the chance of infection and  scarring.    Take medicine as prescribed. If you were prescribed a cream, apply it exactly as directed. Make sure to put the cream not just on the rash, but also on the skin 1 or 2 inches around it. Medicine by mouth is sometimes needed, particularly for ringworm on the scalp. Take it as directed and until your healthcare provider says to stop.    Keep it from spreading to others. Untreated ringworm of the skin is contagious by skin-to-skin contact. Your child may return to school 2 days after treatment has started.  Prevention  To some degree, prevention depends on what part of your body was affected. In general, the following good hygiene can help.    Clean up after you get dirty or sweaty, or after using a locker room.    When possible, don t share delaney and brushes.    Avoid having your skin and feet wet or damp for long periods.    Wear clean, loose-fitting underwear.  Follow-up care  Follow up with your healthcare provider as advised by our staff if the rash does not improve after 10 days of treatment or if the rash spreads to other areas of the body.  When to seek medical advice  Call your healthcare provider right away if any of these occur:    Redness around the rash gets worse    Fluid drains from the rash    Fever of 100.4 F (38 C) or higher, or as directed by your healthcare provider  Date Last Reviewed: 8/1/2016 2000-2017 [employer]. [address] [place] [address], [place] [address]. All rights reserved. This information is not intended as a substitute for professional medical care. Always follow your healthcare professional's instructions.

## 2017-12-15 ASSESSMENT — ASTHMA QUESTIONNAIRES: ACT_TOTALSCORE: 24

## 2018-09-18 ENCOUNTER — OFFICE VISIT (OUTPATIENT)
Dept: FAMILY MEDICINE | Facility: CLINIC | Age: 43
End: 2018-09-18

## 2018-09-18 ENCOUNTER — RADIANT APPOINTMENT (OUTPATIENT)
Dept: GENERAL RADIOLOGY | Facility: CLINIC | Age: 43
End: 2018-09-18
Attending: PREVENTIVE MEDICINE

## 2018-09-18 VITALS
HEART RATE: 69 BPM | BODY MASS INDEX: 29.86 KG/M2 | WEIGHT: 197 LBS | SYSTOLIC BLOOD PRESSURE: 108 MMHG | DIASTOLIC BLOOD PRESSURE: 65 MMHG | TEMPERATURE: 98.1 F | HEIGHT: 68 IN | OXYGEN SATURATION: 95 %

## 2018-09-18 DIAGNOSIS — M72.2 PLANTAR FASCIITIS: ICD-10-CM

## 2018-09-18 DIAGNOSIS — Z23 NEED FOR PROPHYLACTIC VACCINATION AND INOCULATION AGAINST INFLUENZA: ICD-10-CM

## 2018-09-18 DIAGNOSIS — M72.2 PLANTAR FASCIITIS: Primary | ICD-10-CM

## 2018-09-18 PROCEDURE — 99213 OFFICE O/P EST LOW 20 MIN: CPT | Mod: 25 | Performed by: PREVENTIVE MEDICINE

## 2018-09-18 PROCEDURE — 73630 X-RAY EXAM OF FOOT: CPT | Mod: LT

## 2018-09-18 PROCEDURE — 90471 IMMUNIZATION ADMIN: CPT | Performed by: PREVENTIVE MEDICINE

## 2018-09-18 PROCEDURE — 90686 IIV4 VACC NO PRSV 0.5 ML IM: CPT | Performed by: PREVENTIVE MEDICINE

## 2018-09-18 ASSESSMENT — PAIN SCALES - GENERAL: PAINLEVEL: EXTREME PAIN (9)

## 2018-09-18 NOTE — MR AVS SNAPSHOT
After Visit Summary   9/18/2018    Chun Coley    MRN: [id number]           Patient Information     Date Of Birth          1975        Visit Information        Provider Department      9/18/2018 9:40 AM Jessica Ramirez MD [facility]        Today's Diagnoses     Plantar fasciitis    -  1      Care Instructions    At [facility], we strive to deliver an exceptional experience to you, every time we see you.  If you receive a survey in the mail, please send us back your thoughts. We really do value your feedback.    Based on your medical history, these are the current health maintenance/preventive care services that you are due for (some may have been done at this visit.)  Health Maintenance Due   Topic Date Due     HIV SCREEN (SYSTEM ASSIGNED)  06/12/1991     LIPID SCREEN Q5 YR MALE (SYSTEM ASSIGNED)  06/12/2008     ASTHMA CONTROL TEST Q6 MOS  06/14/2018     INFLUENZA VACCINE (1) 09/01/2018       Suggested websites for health information:  Www.[employer]okDok : Up to date and easily searchable information on multiple topics.  Www.medlineplus.gov : medication info, interactive tutorials, watch real surgeries online  Www.familydoctor.org : good info from the Academy of Family Physicians  Www.cdc.gov : public health info, travel advisories, epidemics (H1N1)  Www.aap.org : children's health info, normal development, vaccinations  Www.health.state[place] : [place] dept of health, public health issues in [place], N1N1    Your care team:                            Family Medicine Internal Medicine   MD Yosi Hernandez MD Shantel Branch-Fleming, MD Katya Georgiev PA-C Megan Hill, APRN CNP Nam Ho, MD Pediatrics   RALEIGH Marshall, MD Traci Jameson APRN CNP   MD Pratima Hanson MD Deborah Mielke, MD Kim Thein, APRN CRISTEL      Clinic hours: Monday - Thursday 7 am-7 pm; Fridays 7 am-5 pm.   Urgent care:  Monday - Friday 11 am-9 pm; Saturday and Sunday 9 am-5 pm.  Pharmacy : Monday -Thursday 8 am-8 pm; Friday 8 am-6 pm; Saturday and Sunday 9 am-5 pm.     Clinic: [phone]   Pharmacy: [phone]      Fascitis Plantar [Plantar Fasciitis]  La fascitis plantar es carmelita inflamación dolorosa del tejido que cubre los huesos en la planta de banegas pie. Ésta se puede desarrollar gradualmente o repentinamente. Normalmente afecta a un pie por vez. El dolor de talón puede ser forest y sentirse pierre un cuchillo clavándose en la planta de banegas pie. El dolor puede aparecer después de hacer ejercicio, correr distancias largas, subir escaleras, estar parado por mucho tiempo, o después de levantarse de carmelita posición sentado.  Los factores de riesgo incluyen la artritis, diabetes, obesidad o aumento de peso reciente, pie plano, arco elevado, el uso de tacones altos o zapatos flojos o con un mal soporte de arco.  El dolor de pie derivado de esta condición normalmente es peor leo la mañana y mejora caminando. Al final del día puede vanessa carmelita molestia moderada. El tratamiento requiere reposo a corto plazo y control de la inflamación. Puede viola hasta nueve meses antes de que los síntomas desparezcan con las medidas descritas a continuación. Raramente, puede ser necesaria carmelita inyección de esteroides en el pie o cirugía.  Cuidado En [place]:  1. Si usted tiene sobrepeso, adelgace para promover la sanación. Reemplace los zapatos de hacer deporte cuando estén desgastados. No camine o corra descalzo.  2. Elija zapatos con un buen arco de soporte y absorción de los golpes.  3. Las plantillas especiales son carmelita parte importante del tratamiento. Éstas brindan un soporte óptimo para el arco. Aunque usted puede adquirir plantillas económicas de venta vanessa, las mejores son las que se elaboran a banegas medida por banegas podólogo (especialista de los pies).   4. Las tablillas o férulas nocturnas (suministradas por un podólogo) mantinenen el talón estirado  mientras que duerme, previniendo así el dolor en la mañana.  5. Evite las actividades que esfuercen los pies pierre trotar, permanecer de pie o caminar por mucho tiempo, los deportes de contacto, etc.  6. Lo coral que debe hacer en la mañana y antes de hacer deportes, es estirar la planta de los pies. Flexione suavemente banegas tobillo de manera que el pie se mueva hacia la rodilla.  7. Aplicarse hielo puede ayudar a controlar el dolor en el talón. Aplíquese compresas de hielo (cubitos de hielo en carmelita bolsa plástica, envuelta en carmelita toalla) en el talón leo 10-20 minutos pierre carmelita medida preventiva o después de un ataque forest de los síntomas. Puede repetir esto cada 1-2 horas según sea necesario.  8. Puede usar acetaminofén (Tylenol) o ibuprofeno (Motrin, Advil) para controlar el dolor, a menos que le receten otro medicamento para el dolor. [NOTA: Si tiene carmelita enfermedad del hígado o los riñones, o si alguna vez ha tenido carmelita úlcera estomacal o sangrado gastrointestinal, hable con banegas médico antes de usar estos medicamentos.]  Seguimiento:  con banegas médico o de acuerdo a lo indicado por nuestro personal. Llame para pedir un turno si el dolor empeora o no hay alivio luego de unas semanas de tratamiento en casa. Podría necesitar plantillas, férulas nocturnas, o carmelita bota de yeso.  [NOTA: Si le hicieron radiografías, serán examinadas por un radiólogo. Le avisarán si hay algo nuevo que pueda afectar banegas atención]  Busque Prontamente Atención Médica Si Algo De Lo Siguiente Ocurre:    Hinchazón del pie con enrojecimiento o dolor creciente  Date Last Reviewed: 11/21/2015 2000-2017 [employer]. [address] [place] [address], [place] [address]. Todos los derechos reservados. Esta información no pretende sustituir la atención médica profesional. Sólo banegas médico puede diagnosticar y tratar un problema de kelly.                Follow-ups after your visit        Future tests that were ordered for you today     Open Future  "Orders        Priority Expected Expires Ordered    XR Foot Left G/E 3 Views Routine 2018            Who to contact     If you have questions or need follow up information about today's clinic visit or your schedule please contact [facility] directly at [phone].  Normal or non-critical lab and imaging results will be communicated to you by MyChart, letter or phone within 4 business days after the clinic has received the results. If you do not hear from us within 7 days, please contact the clinic through [employer] or phone. If you have a critical or abnormal lab result, we will notify you by phone as soon as possible.  Submit refill requests through [employer] or call your pharmacy and they will forward the refill request to us. Please allow 3 business days for your refill to be completed.          Additional Information About Your Visit        MyChart Information     [employer] lets you send messages to your doctor, view your test results, renew your prescriptions, schedule appointments and more. To sign up, go to [email or web address] . Click on \"Log in\" on the left side of the screen, which will take you to the Welcome page. Then click on \"Sign up Now\" on the right side of the page.     You will be asked to enter the access code listed below, as well as some personal information. Please follow the directions to create your username and password.     Your access code is: [email or web address]  Expires: 2018 10:00 AM     Your access code will  in 90 days. If you need help or a new code, please call your [place] clinic or [phone].        Care EveryWhere ID     This is your Care EveryWhere ID. This could be used by other organizations to access your [place] medical records  [id number]        Your Vitals Were     Pulse Temperature Height Pulse Oximetry BMI (Body Mass Index)       69 98.1  F (36.7  C) (Oral) 5' 7.5\" (1.715 m) 95% 30.4 kg/m2        Blood Pressure " from Last 3 Encounters:   09/18/18 108/65   12/14/17 122/74   07/11/17 119/81    Weight from Last 3 Encounters:   09/18/18 197 lb (89.4 kg)   12/14/17 206 lb 8 oz (93.7 kg)   07/11/17 201 lb 3.2 oz (91.3 kg)                 Today's Medication Changes          These changes are accurate as of 9/18/18 10:01 AM.  If you have any questions, ask your nurse or doctor.               Start taking these medicines.        Dose/Directions    order for DME   Used for:  Plantar fasciitis   Started by:  Jessica Ramirez MD        Equipment being ordered: Night splint for plantar fasciitis, left side   Quantity:  1 Device   Refills:  0         These medicines have changed or have updated prescriptions.        Dose/Directions    albuterol 108 (90 Base) MCG/ACT inhaler   Commonly known as:  PROAIR HFA/PROVENTIL HFA/VENTOLIN HFA   This may have changed:  Another medication with the same name was removed. Continue taking this medication, and follow the directions you see here.   Used for:  Acute bronchitis with symptoms > 10 days   Changed by:  Jessica Ramirez MD        Dose:  2 puff   Inhale 2 puffs into the lungs every 4 hours as needed for shortness of breath / dyspnea or wheezing   Quantity:  1 Inhaler   Refills:  4         Stop taking these medicines if you haven't already. Please contact your care team if you have questions.     ketoconazole 2 % cream   Commonly known as:  NIZORAL   Stopped by:  Jessica Ramirez MD                Where to get your medicines      Some of these will need a paper prescription and others can be bought over the counter.  Ask your nurse if you have questions.     Bring a paper prescription for each of these medications     order for DME                Primary Care Provider Office Phone # Fax #    [facility] [phone]       [id number]  [facility] [address]        Equal Access to Services     NEHAL BROCK AH: jo Rivera qaybta  aric pintorussell krishnamurthy ah. Juanis [facility] [phone].    ATENCIÓN: Si johnny izaguirre, tiene a banegas disposición servicios gratuitos de asistencia lingüística. Endy al [phone].    We comply with applicable federal civil rights laws and [place] laws. We do not discriminate on the basis of race, color, national origin, age, disability, sex, sexual orientation, or gender identity.            Thank you!     Thank you for choosing [facility]  for your care. Our goal is always to provide you with excellent care. Hearing back from our patients is one way we can continue to improve our services. Please take a few minutes to complete the written survey that you may receive in the mail after your visit with us. Thank you!             Your Updated Medication List - Protect others around you: Learn how to safely use, store and throw away your medicines at www.disposemymeds.org.          This list is accurate as of 9/18/18 10:01 AM.  Always use your most recent med list.                   Brand Name Dispense Instructions for use Diagnosis    albuterol 108 (90 Base) MCG/ACT inhaler    PROAIR HFA/PROVENTIL HFA/VENTOLIN HFA    1 Inhaler    Inhale 2 puffs into the lungs every 4 hours as needed for shortness of breath / dyspnea or wheezing    Acute bronchitis with symptoms > 10 days       aspirin 81 MG tablet      Take 1 tablet by mouth daily        order for DME     1 Device    Equipment being ordered: Night splint for plantar fasciitis, left side    Plantar fasciitis

## 2018-09-18 NOTE — PROGRESS NOTES
Please send a letter:    Dear Chun Coley,    X rays of the foot did not show any bony abnormalities. Plan of care and follow up as discussed in clinic.  Please let me know if you have any questions and thank you for choosing [facility].    Regards,    Jessica Ramirez MD MPH

## 2018-09-18 NOTE — PROGRESS NOTES

## 2018-09-18 NOTE — PATIENT INSTRUCTIONS
At [facility], we strive to deliver an exceptional experience to you, every time we see you.  If you receive a survey in the mail, please send us back your thoughts. We really do value your feedback.    Based on your medical history, these are the current health maintenance/preventive care services that you are due for (some may have been done at this visit.)  Health Maintenance Due   Topic Date Due     HIV SCREEN (SYSTEM ASSIGNED)  06/12/1991     LIPID SCREEN Q5 YR MALE (SYSTEM ASSIGNED)  06/12/2008     ASTHMA CONTROL TEST Q6 MOS  06/14/2018     INFLUENZA VACCINE (1) 09/01/2018       Suggested websites for health information:  Www.[facility].org : Up to date and easily searchable information on multiple topics.  Www.medlineplus.gov : medication info, interactive tutorials, watch real surgeries online  Www.familydoctor.org : good info from the Academy of Family Physicians  Www.cdc.gov : public health info, travel advisories, epidemics (H1N1)  Www.aap.org : children's health info, normal development, vaccinations  Www.health.[facility].[place].us : [place] dept of health, public health issues in [place], N1N1    Your care team:                            Family Medicine Internal Medicine   MD Yosi Hernandez MD Shantel Branch-Fleming, MD Katya Georgiev PA-C Megan Hill, APRN CNP    Eliud Fatima MD Pediatrics   Pasquale Leon, PAKRISTEN Rowe, CNP MD Traci Cisneros APRN CNP   MD Pratima Hanson MD Deborah Mielke, MD Kim Thein, APRN [facility]      Clinic hours: Monday - Thursday 7 am-7 pm; Fridays 7 am-5 pm.   Urgent care: Monday - Friday 11 am-9 pm; Saturday and Sunday 9 am-5 pm.  Pharmacy : Monday -Thursday 8 am-8 pm; Friday 8 am-6 pm; Saturday and Sunday 9 am-5 pm.     Clinic: [phone]   Pharmacy: [phone]      Fascitis Plantar [Plantar Fasciitis]  La fascitis plantar es carmelita inflamación dolorosa del tejido que cubre los huesos en la planta de banegas pie. Ésta se  puede desarrollar gradualmente o repentinamente. Normalmente afecta a un pie por vez. El dolor de talón puede ser forest y sentirse pierre un cuchillo clavándose en la planta de banegas pie. El dolor puede aparecer después de hacer ejercicio, correr distancias largas, subir escaleras, estar parado por mucho tiempo, o después de levantarse de carmelita posición sentado.  Los factores de riesgo incluyen la artritis, diabetes, obesidad o aumento de peso reciente, pie plano, arco elevado, el uso de tacones altos o zapatos flojos o con un mal soporte de arco.  El dolor de pie derivado de esta condición normalmente es peor leo la mañana y mejora caminando. Al final del día puede vanessa carmelita molestia moderada. El tratamiento requiere reposo a corto plazo y control de la inflamación. Puede viola hasta nueve meses antes de que los síntomas desparezcan con las medidas descritas a continuación. Raramente, puede ser necesaria carmelita inyección de esteroides en el pie o cirugía.  Cuidado En [place]:  1. Si usted tiene sobrepeso, adelgace para promover la sanación. Reemplace los zapatos de hacer deporte cuando estén desgastados. No camine o corra descalzo.  2. Elija zapatos con un buen arco de soporte y absorción de los golpes.  3. Las plantillas especiales son carmelita parte importante del tratamiento. Éstas brindan un soporte óptimo para el arco. Aunque usted puede adquirir plantillas económicas de venta vanessa, las mejores son las que se elaboran a banegas medida por banegas podólogo (especialista de los pies).   4. Las tablillas o férulas nocturnas (suministradas por un podólogo) mantinenen el talón estirado mientras que duerme, previniendo así el dolor en la mañana.  5. Evite las actividades que esfuercen los pies pierre trotar, permanecer de pie o caminar por mucho tiempo, los deportes de contacto, etc.  6. Lo coral que debe hacer en la mañana y antes de hacer deportes, es estirar la planta de los pies. Flexione suavemente banegas tobillo de manera que el pie se  mueva hacia la rodilla.  7. Aplicarse hielo puede ayudar a controlar el dolor en el talón. Aplíquese compresas de hielo (cubitos de hielo en carmelita bolsa plástica, envuelta en carmelita toalla) en el talón leo 10-20 minutos pierre carmelita medida preventiva o después de un ataque forest de los síntomas. Puede repetir esto cada 1-2 horas según sea necesario.  8. Puede usar acetaminofén (Tylenol) o ibuprofeno (Motrin, Advil) para controlar el dolor, a menos que le receten otro medicamento para el dolor. [NOTA: Si tiene carmelita enfermedad del hígado o los riñones, o si alguna vez ha tenido carmelita úlcera estomacal o sangrado gastrointestinal, hable con banegas médico antes de usar estos medicamentos.]  Seguimiento:  con banegas médico o de acuerdo a lo indicado por nuestro personal. Llame para pedir un turno si el dolor empeora o no hay alivio luego de unas semanas de tratamiento en casa. Podría necesitar plantillas, férulas nocturnas, o carmelita bota de yeso.  [NOTA: Si le hicieron radiografías, serán examinadas por un radiólogo. Le avisarán si hay algo nuevo que pueda afectar banegas atención]  Busque Prontamente Atención Médica Si Algo De Lo Siguiente Ocurre:    Hinchazón del pie con enrojecimiento o dolor creciente  Date Last Reviewed: 11/21/2015 2000-2017 [employer]. [address] [place] [address], [place] [address]. Todos los derechos reservados. Esta información no pretende sustituir la atención médica profesional. Sólo banegas médico puede diagnosticar y tratar un problema de kelyl.

## 2018-09-18 NOTE — PROGRESS NOTES
SUBJECTIVE:   Chun Coley is a 43 year old male who presents to clinic today for the following health issues:      Musculoskeletal problem/pain      Duration: x 2 months    Description  Location: left heel    Intensity:  9/10    Accompanying signs and symptoms: none    History  Previous similar problem: no   Previous evaluation:  none    Precipitating or alleviating factors:  Trauma or overuse: no   Aggravating factors include: bad in morning    Therapies tried and outcome: heat, ice and NSAID - Aleve    Long driving hours    Plays soccer on the weekend    Not awakening at night    No rash     Problem list and histories reviewed & adjusted, as indicated.  Additional history: as documented    Patient Active Problem List   Diagnosis     CARDIOVASCULAR SCREENING; LDL GOAL LESS THAN 160     Hyperlipidemia     Mild intermittent asthma without complication     History reviewed. No pertinent surgical history.    Social History   Substance Use Topics     Smoking status: Never Smoker     Smokeless tobacco: Never Used     Alcohol use Yes     No family history on file.      Current Outpatient Prescriptions   Medication Sig Dispense Refill     albuterol (PROAIR HFA, PROVENTIL HFA, VENTOLIN HFA) 108 (90 BASE) MCG/ACT inhaler Inhale 2 puffs into the lungs every 4 hours as needed for shortness of breath / dyspnea or wheezing 1 Inhaler 4     aspirin 81 MG tablet Take 1 tablet by mouth daily       order for DME Equipment being ordered: Night splint for plantar fasciitis, left side 1 Device 0     [DISCONTINUED] albuterol (PROAIR HFA/PROVENTIL HFA/VENTOLIN HFA) 108 (90 BASE) MCG/ACT Inhaler Inhale 2 puffs into the lungs every 6 hours as needed 18 g 3     Allergies   Allergen Reactions     No Clinical Screening - See Comments Itching     BP Readings from Last 3 Encounters:   09/18/18 108/65   12/14/17 122/74   07/11/17 119/81    Wt Readings from Last 3 Encounters:   09/18/18 197 lb (89.4 kg)   12/14/17 206 lb 8 oz (93.7 kg)  "  07/11/17 201 lb 3.2 oz (91.3 kg)                  Labs reviewed in EPIC    Reviewed and updated as needed this visit by clinical staff  Allergies  Meds       Reviewed and updated as needed this visit by Provider         ROS:  Constitutional, HEENT, cardiovascular, pulmonary, gi and gu systems are negative, except as otherwise noted.    OBJECTIVE:                                                    /65  Pulse 69  Temp 98.1  F (36.7  C) (Oral)  Ht 5' 7.5\" (1.715 m)  Wt 197 lb (89.4 kg)  SpO2 95%  BMI 30.4 kg/m2  Body mass index is 30.4 kg/(m^2).  GENERAL APPEARANCE: healthy, alert and no distress  EYES: Eyes grossly normal to inspection and conjunctivae and sclerae normal  RESP: lungs clear to auscultation - no rales, rhonchi or wheezes  CV: regular rates and rhythm, normal S1 S2, no S3 or S4 and no murmur, click or rub  MS: extremities normal- no gross deformities noted and peripheral pulses normal  SKIN: no suspicious lesions or rashes  NEURO: Normal strength and tone, mentation intact and speech normal  PSYCH: mentation appears normal and affect normal/bright    Left foot: No gross deformities. Strength and range of motion intact. Neurovascular intact, warm, no rash, capillary refill less than 2 seconds. Tender over the calcaneus.     Diagnostic test results:  Diagnostic Test Results:  No results found for this or any previous visit (from the past 24 hour(s)).     X rays of the left foot:  My independent review of the images does not show any acute fractures or bony abnormalities. Calcaneal spurs noted. Await final radiology reading.  Jessica Ramirez MD MPH       ASSESSMENT/PLAN:                                                    1. Plantar fasciitis  -No acute changes on X rays  - order for DME; Equipment being ordered: Night splint for plantar fasciitis, left side  Dispense: 1 Device; Refill: 0  -patient information materials and exercises from Up to Date provided   - XR Foot Left G/E 3 Views; " Future    Discussed treatment strategies as follows:  Stretching exercises for the calf muscles and the plantar fascia, which the patient can perform at home   Avoiding the use of flat shoes and bare-foot walking   Using prefabricated, over-the-counter, silicone heel shoe inserts (arch supports and/or heel cups)   Decreasing physical activities that are suggested by the medical history to be causative or aggravating (eg, excessive running, dancing, or jumping)   Prescribing or recommending a short-term (two to three weeks) trial of NSAIDs is reasonable, but long-term use should be reserved for patients with known systemic rheumatic disease   Injecting the tender areas of the plantar region with glucocorticoids and a local anesthetic.  If the treatments above have not produced sufficient improvement, more costly therapies can be considered although these remain unproven:  Molded shoe inserts (orthotics)   Night splints   Immobilization with a cast   Extracorporeal shock wave therapy  Surgery is generally reserved for those patients who do not respond to at least 6 to 12 months of conservative therapy.      2. Need for prophylactic vaccination and inoculation against influenza  - FLU VACCINE, SPLIT VIRUS, IM (QUADRIVALENT) [69004]- >3 YRS  - Vaccine Administration, Initial [64177]      Follow up with Provider - if not improving in 4 weeks then refer to Podiatry    See Patient Instructions    Jessica Ramirez MD MPH    [facility]

## 2018-09-18 NOTE — LETTER
September 18, 2018      Chun Coley  [address] [place] [address]            Dear Chun,    X rays of the foot did not show any bony abnormalities. Plan of care and follow up as discussed in clinic.  Please let me know if you have any questions and thank you for choosing [facility].    Regards,    Jessica Ramirez MD MPH/ag      Results for orders placed or performed in visit on 09/18/18   XR Foot Left G/E 3 Views    Narrative    XR FOOT LT G/E 3 VW 9/18/2018 10:14 AM    HISTORY: Plantar fasciitis.    COMPARISON: None.    FINDINGS: No fracture or malalignment. No significant osseous  degenerative change. Osseous structures appear normal.      Impression    IMPRESSION: No acute osseous abnormality.    LAURA NICOHLS MD

## 2018-09-20 ASSESSMENT — ASTHMA QUESTIONNAIRES: ACT_TOTALSCORE: 24

## 2022-05-19 ENCOUNTER — NURSE TRIAGE (OUTPATIENT)
Dept: FAMILY MEDICINE | Facility: CLINIC | Age: 47
End: 2022-05-19

## 2022-05-19 NOTE — TELEPHONE ENCOUNTER
Called and left patient a detailed VM message on phone line to call back to [facility] and schedule virtual visit for tomorrow with Dr. Almazan. He is only provider with virtual availability tomorrow with [facility].  Patient has not seen primary care since September 2018. Also needs to establish care with a primary provider.        Karlene Gonzalez RN  [facility]

## 2022-05-19 NOTE — TELEPHONE ENCOUNTER
Patient calling reporting blood in his semen. He first noticed it about two months ago. He also has some redness on the tip/head of his penis, he was given a cream for it but it has not helped. He denies pain, fever, swelling, discharge, or problems with urination.    Routing to PCP clinic for scheduling and follow-up - could a virtual appt work vs. Urgent Care visit?    Reason for Disposition    Blood in semen    Additional Information    Negative: Sounds like a life-threatening emergency to the triager    Negative: Followed a genital area injury    Negative: Pain or burning with passing urine (urination) is main symptom    Negative: Pain in scrotum or testicle is main symptom    Negative: Swollen scrotum OR lump in the scrotum/groin area    Negative: Pubic lice suspected    Negative: STD exposure and prevention, question about    Negative: SEVERE pain (e.g., excruciating)    Negative: Large amount of blood from end of penis    Negative: Foreskin pulled back and stuck (not circumcised)    Negative: Fever > 100.4 F (38.0 C)    Negative: Unable to urinate (or only a few drops) and bladder feels very full    Negative: Prolonged unwanted erection (i.e., not related to sexual interest) and lasting > 4 hours    Negative: Patient sounds very sick or weak to the triager    Negative: Entire penis is swollen (i.e., edema)    Negative: Looks infected (e.g., draining sore, spreading redness)    Negative: Pain or burning with passing urine (urination)    Negative: Blood in urine (red, pink, or tea-colored)    Negative: Pus (white, yellow) or bloody discharge from end of penis    Negative: Swollen foreskin (not circumcised)    Negative: Tiny water blisters rash, 3 or more    Negative: Antibiotic treatment > 3 days for STD (e.g., penile discharge from gonorrhea, chlamydia) and painful urination not improved    Negative: Patient wants to be seen    Negative: SEVERE itching (i.e., interferes with work or school)    Negative:  Painless rash (e.g., redness, tiny bumps, sore) present > 24 hours    Protocols used: PENIS AND SCROTUM SYMPTOMS-A-OH    Arabella Gabriel RN  [facility]

## 2022-07-12 ENCOUNTER — OFFICE VISIT (OUTPATIENT)
Dept: URGENT CARE | Facility: URGENT CARE | Age: 47
End: 2022-07-12

## 2022-07-12 VITALS
TEMPERATURE: 97.9 F | OXYGEN SATURATION: 96 % | HEART RATE: 58 BPM | SYSTOLIC BLOOD PRESSURE: 128 MMHG | HEIGHT: 68 IN | BODY MASS INDEX: 30.37 KG/M2 | WEIGHT: 200.4 LBS | DIASTOLIC BLOOD PRESSURE: 79 MMHG

## 2022-07-12 DIAGNOSIS — R21 PENILE RASH: Primary | ICD-10-CM

## 2022-07-12 PROCEDURE — 99203 OFFICE O/P NEW LOW 30 MIN: CPT | Performed by: PHYSICIAN ASSISTANT

## 2022-07-12 RX ORDER — NYSTATIN 100000 U/G
CREAM TOPICAL 2 TIMES DAILY
Qty: 30 G | Refills: 0 | Status: SHIPPED | OUTPATIENT
Start: 2022-07-12 | End: 2022-07-26

## 2022-07-12 NOTE — PROGRESS NOTES
[employer]  Chief Complaint   Patient presents with     Penis/Scrotum Problem     Pt said that he's having redness on the tip of his penis.         ASSESSMENT:      ICD-10-CM    1. Penile rash  R21 nystatin (MYCOSTATIN) 126628 UNIT/GM external cream     Adult Urology Referral           PLAN: Penile rash with areas of confluent erythema and areas of hypopigmentation.  Red areas are suspicious for yeast.  Unclear about because of hypopigmented areas.  We will try prescription for nystatin.  Follow-up with urology if no resolution.      Fouzia Topete PA-C        Subjective:   47-year-old male presents for recurrent penile rash for several months.  Seen at a hand pain clinic and October 2021 for it.  He was negative for chlamydia, gonorrhea, syphilis, HIV, trichomonas, UTI.  Given topical clotrimazole.  Resolved but then came back.  .  His wife does not have any symptoms.  No fever.    Allergies   Allergen Reactions     No Clinical Screening - See Comments Itching       Past Medical History:   Diagnosis Date     Allergic rhinitis      Asthma      Hypercholesterolemia        albuterol (PROAIR HFA, PROVENTIL HFA, VENTOLIN HFA) 108 (90 BASE) MCG/ACT inhaler, Inhale 2 puffs into the lungs every 4 hours as needed for shortness of breath / dyspnea or wheezing (Patient not taking: Reported on 7/12/2022)  aspirin 81 MG tablet, Take 1 tablet by mouth daily (Patient not taking: Reported on 7/12/2022)  order for DME, Equipment being ordered: Night splint for plantar fasciitis, left side (Patient not taking: Reported on 7/12/2022)    No current facility-administered medications on file prior to visit.      Social History     Tobacco Use     Smoking status: Never Smoker     Smokeless tobacco: Never Used   Substance Use Topics     Alcohol use: Yes     Drug use: No       ROS:  General: negative for fever  ABD: Denies abd pain  : as above    OBJECTIVE:  /79 (BP Location: Left arm, Patient Position: Sitting, Cuff Size: Adult  "Regular)   Pulse 58   Temp 97.9  F (36.6  C) (Tympanic)   Ht 1.727 m (5' 8\")   Wt 90.9 kg (200 lb 6.4 oz)   SpO2 96%   BMI 30.47 kg/m     General:   awake, alert, and cooperative.  NAD.   Head: Normocephalic, atraumatic.  Eyes: Conjunctiva clear, non icteric.   Neuro: Alert and oriented - normal speech.     Penis-uncircumcised.  Shaft has some confluent red areas.  Below corona there are some actual hypopigmented areas.  No bumpy lesions.  Skulls.            "